# Patient Record
Sex: FEMALE | Race: WHITE | NOT HISPANIC OR LATINO | Employment: FULL TIME | URBAN - METROPOLITAN AREA
[De-identification: names, ages, dates, MRNs, and addresses within clinical notes are randomized per-mention and may not be internally consistent; named-entity substitution may affect disease eponyms.]

---

## 2018-01-31 ENCOUNTER — HOSPITAL ENCOUNTER (OUTPATIENT)
Dept: MAMMOGRAPHY | Facility: CLINIC | Age: 23
Discharge: HOME | End: 2018-01-31
Attending: OBSTETRICS & GYNECOLOGY | Admitting: OBSTETRICS & GYNECOLOGY
Payer: COMMERCIAL

## 2018-01-31 DIAGNOSIS — N63.0 BREAST LUMP: ICD-10-CM

## 2018-01-31 PROCEDURE — 76642 ULTRASOUND BREAST LIMITED: CPT | Mod: 50

## 2018-02-06 ENCOUNTER — TRANSFERRED RECORDS (OUTPATIENT)
Dept: HEALTH INFORMATION MANAGEMENT | Facility: CLINIC | Age: 23
End: 2018-02-06

## 2018-03-12 ENCOUNTER — HOSPITAL ENCOUNTER (OUTPATIENT)
Dept: OCCUPATIONAL THERAPY | Facility: CLINIC | Age: 23
Setting detail: THERAPIES SERIES
End: 2018-03-12
Attending: PHYSICAL MEDICINE & REHABILITATION
Payer: COMMERCIAL

## 2018-03-12 PROCEDURE — 97166 OT EVAL MOD COMPLEX 45 MIN: CPT | Mod: GO | Performed by: REHABILITATION PRACTITIONER

## 2018-03-12 PROCEDURE — 40000249 ZZH STATISTIC VISIT LOW VISION CLINIC: Performed by: REHABILITATION PRACTITIONER

## 2018-03-12 PROCEDURE — 97535 SELF CARE MNGMENT TRAINING: CPT | Mod: GO | Performed by: REHABILITATION PRACTITIONER

## 2018-03-12 ASSESSMENT — ACTIVITIES OF DAILY LIVING (ADL): PRIOR_ADL/IADL_STATUS: INDEPENDENT PRIOR TO ONSET

## 2018-03-13 NOTE — PROGRESS NOTES
03/12/18 1100   Visit Type   Type of Visit Initial       Present No   General Information   Start Of Care Date 03/12/18   Referring Physician Dr. 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  On January 15, 2018 KASI noticed decreased vision in her right eye resulting in a complete right visual field deficit.  Patient is scheduled to follow-up with neuro-ophthalmologist for further visual field testing.   Prior ADL/IADL status Independent prior to onset   Others present at visit Parent(s)   Patient/family Goals Statement Return to driving, improve reading speed, reading tolerance, return to work,    General information comments Pt had neuro ophthalmology assessment and determined field of vision was 73 degrees of visual field in November and has another appointment in April with Dr. Sanchez.  Previous OT provided eye exercises.  Dr. Haq indicated that there is potential for improvement.  Pt also has pseudo strokes as a result of medications.     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  Pt works in  specific area of /.  Pt is coaching for volleyball.   Social/Environment Comment Pt is taking a break with speech therapy for 2 months and is getting assessed for intensive aphasia program.  Pt is attending a GED program.     Pain Assessment   Pain Reported No   Comments Pt has had 2 mimic strokes and was admitted to [facility].     Fall Risk Screen   Fall screen completed by OT   Cognitive/Behavioral   Communication Impaired   Cognitive Status Impaired   Behavior Appropriate   Patient/family aware of diagnosis Yes   How well do you understand your eye condition? Well   Vision related restrictions on visiting with family/friends Moderate   Reported emotional impact of visual impairment Moderate   Adjustment to disability Good   Cognitive/Behavioral Comment Pt has global aphasia impacting her ability to communicate and comprehend.  Pt reports talking slowly helps her to understand verbal information.  Often looks for mom for clarification and assitance in explaining her PMH   Physical Status/Equipment   Physical Status No problems observed/reported   Visual Report   Functional Complaints Reading;Writing;School related tasks;Work related tasks   Visual Complaints Constricted visual fields right eye;Constricted visual fields left eye;Visual fatigue   Complaints comments Right homonymous hemianopsia, interfears with reading and searching extra personal spaces.     Reading glasses (Glasses for distance)   Technology Computer   Equipment comments Cell phone   Lighting and Glare   Is your lighting adequate? Yes/ at home   Contrast Sensitivity   Contrast sensitivity (score/25) 25/25   MN Read   Smallest print size read .4M with errors, 1.0 without errors   Critical print size 2.0M   Words per minute at critical print size 84.5 (150 WNL) and also impacted by aphasia   Functional Reading Screen   Identifies medication bottles Yes   Reads bills Yes   Reads recipes Yes   Reads directions Yes    Reading screen comments Able to read for functional ADL tasks   Education   Learner Patient;Family   Readiness Acceptance   Method Explanation   Response Verbalizes understanding   Education Notes Role of low vision OT   Clinical Impression, OT Eval   Criteria for Skilled Therapeutic Interventions Met yes;treatment indicated   Therapy  Diagnosis: Impaired ADL/IADL with deficits in Reading based ADL;Written communication;School performance;Home management;Work performance   Impairment comments Impairment with all reading based ADL tasks   Assessment of Occupational Performance 3-5 Performance Deficits   Identified Performance Deficits Impairment with all reading based ADL tasks   Clinical Decision Making (Complexity) Moderate complexity   Clinical Impression Comments Pt will benefit from skilled low vision services to improve I and safety with ADL/IADL I   OT Visual Rehabilitation Evaluation Plan   Therapy Plan Occupational therapy intervention   Planned Interventions Visual field loss awareness;Visual skills training for near tasks;Visual skills training for safety in mobility;Visual skills training for location of objects;Visual perceptual training;Low vision compensatory training for reading;Instruction in environmental adaptations for glare;Instruction in environmental adaptations for contrast;Instruction in environmental adaptations for lighting;Optical device/ADL device instruction and training  (Community reintegration)   Frequency / Duration 1x/week x 8 weeks   Risks and Benefits of Treatment have been explained. Yes   Patient, Family in agreement with plan of care Yes   GOALS   Goals Near Vision;Visual Field;Environmental Modification;7;8   Goal 1 - Near Vision   Goal Description: Near Vision Patient will verbalize awareness of visual field Loss and demonstrate improved use of visual skills/adaptive equipment for increased independence in reading-based activities of daily living, written communication and  detail ADL tasks.   Target Date 05/08/18   Goal 2 - Visual field   Goal Description: Visual field Patient will verbalize awareness of visual field loss and demonstrate improved use of visual skills for increased safety/ independence in locating objects/obstacles and in navigation   Target Date 05/08/18   Goal 3 - Environmental modification   Goal Description: Environment modification Patient will demonstrate understanding of the impact of lighting, contrast and glare on ADL activities, in conjunction with environmentally-based ADL modifications   Target Date 05/08/18   Goal 7   Goal Description Pt will complete multiple step problem solving task using 2 organizational strategies and visual accommodations with 90% accuracy.    Target Date 05/08/18   Goal 8   Goal Description Pt will demonstrate ability to read text for 20 minutes for leisure with use of 1-2 environmental adaptations per pt report.    Target Date 05/08/18   Total Evaluation Time   Total Evaluation Time 32   Therapy Certification   Certification date from 03/12/18   Certification date to 05/08/18   Medical Diagnosis Stroke with vision loss, ALL   Certification I certify the need for these services furnished under this plan of treatment and while under my care.  (Physician co-signature of this document indicates review and certification of the therapy plan).

## 2018-03-21 ENCOUNTER — HOSPITAL ENCOUNTER (OUTPATIENT)
Dept: OCCUPATIONAL THERAPY | Facility: CLINIC | Age: 23
Setting detail: THERAPIES SERIES
End: 2018-03-21
Attending: PHYSICAL MEDICINE & REHABILITATION
Payer: COMMERCIAL

## 2018-03-21 PROCEDURE — 40000249 ZZH STATISTIC VISIT LOW VISION CLINIC: Performed by: REHABILITATION PRACTITIONER

## 2018-03-21 PROCEDURE — 97530 THERAPEUTIC ACTIVITIES: CPT | Mod: GO | Performed by: REHABILITATION PRACTITIONER

## 2018-03-21 PROCEDURE — 97535 SELF CARE MNGMENT TRAINING: CPT | Mod: GO | Performed by: REHABILITATION PRACTITIONER

## 2018-04-02 ENCOUNTER — HOSPITAL ENCOUNTER (OUTPATIENT)
Dept: OCCUPATIONAL THERAPY | Facility: CLINIC | Age: 23
Setting detail: THERAPIES SERIES
End: 2018-04-02
Attending: PHYSICAL MEDICINE & REHABILITATION
Payer: COMMERCIAL

## 2018-04-02 PROCEDURE — 97535 SELF CARE MNGMENT TRAINING: CPT | Mod: GO | Performed by: REHABILITATION PRACTITIONER

## 2018-04-02 PROCEDURE — 40000249 ZZH STATISTIC VISIT LOW VISION CLINIC: Performed by: REHABILITATION PRACTITIONER

## 2018-04-02 PROCEDURE — 97530 THERAPEUTIC ACTIVITIES: CPT | Mod: GO | Performed by: REHABILITATION PRACTITIONER

## 2018-04-11 ENCOUNTER — HOSPITAL ENCOUNTER (OUTPATIENT)
Dept: OCCUPATIONAL THERAPY | Facility: CLINIC | Age: 23
Setting detail: THERAPIES SERIES
End: 2018-04-11
Attending: PHYSICAL MEDICINE & REHABILITATION
Payer: COMMERCIAL

## 2018-04-11 PROCEDURE — 97530 THERAPEUTIC ACTIVITIES: CPT | Mod: GO | Performed by: REHABILITATION PRACTITIONER

## 2018-04-11 PROCEDURE — 40000249 ZZH STATISTIC VISIT LOW VISION CLINIC: Performed by: REHABILITATION PRACTITIONER

## 2018-04-18 ENCOUNTER — HOSPITAL ENCOUNTER (OUTPATIENT)
Dept: OCCUPATIONAL THERAPY | Facility: CLINIC | Age: 23
Setting detail: THERAPIES SERIES
End: 2018-04-18
Attending: PHYSICAL MEDICINE & REHABILITATION
Payer: COMMERCIAL

## 2018-04-18 PROCEDURE — 97530 THERAPEUTIC ACTIVITIES: CPT | Mod: GO | Performed by: REHABILITATION PRACTITIONER

## 2018-04-18 PROCEDURE — 40000249 ZZH STATISTIC VISIT LOW VISION CLINIC: Performed by: REHABILITATION PRACTITIONER

## 2018-04-18 PROCEDURE — 97535 SELF CARE MNGMENT TRAINING: CPT | Mod: GO,59 | Performed by: REHABILITATION PRACTITIONER

## 2018-04-25 ENCOUNTER — HOSPITAL ENCOUNTER (OUTPATIENT)
Dept: OCCUPATIONAL THERAPY | Facility: CLINIC | Age: 23
Setting detail: THERAPIES SERIES
End: 2018-04-25
Attending: PHYSICAL MEDICINE & REHABILITATION
Payer: COMMERCIAL

## 2018-04-25 PROCEDURE — 97535 SELF CARE MNGMENT TRAINING: CPT | Mod: GO | Performed by: REHABILITATION PRACTITIONER

## 2018-04-25 PROCEDURE — 40000249 ZZH STATISTIC VISIT LOW VISION CLINIC: Performed by: REHABILITATION PRACTITIONER

## 2018-04-25 PROCEDURE — 97530 THERAPEUTIC ACTIVITIES: CPT | Mod: GO | Performed by: REHABILITATION PRACTITIONER

## 2018-05-02 ENCOUNTER — HOSPITAL ENCOUNTER (OUTPATIENT)
Dept: OCCUPATIONAL THERAPY | Facility: CLINIC | Age: 23
Setting detail: THERAPIES SERIES
End: 2018-05-02
Attending: PHYSICAL MEDICINE & REHABILITATION
Payer: COMMERCIAL

## 2018-05-02 PROCEDURE — 97535 SELF CARE MNGMENT TRAINING: CPT | Mod: GO | Performed by: REHABILITATION PRACTITIONER

## 2018-05-02 PROCEDURE — 40000249 ZZH STATISTIC VISIT LOW VISION CLINIC: Performed by: REHABILITATION PRACTITIONER

## 2018-05-02 NOTE — PROGRESS NOTES
05/02/18 1100   Signing Clinician's Name / Credentials   Signing clinician's name / credentials Viktoriay [place] OTR/L   Session Number   Session Number 7/90    Goal 1 - Near Vision   Goal Description: Near Vision Patient will verbalize awareness of visual field Loss and demonstrate improved use of visual skills/adaptive equipment for increased independence in reading-based activities of daily living, written communication and detail ADL tasks.   Target Date 05/08/18   Date Met 05/02/18   Goal 2 - Visual field   Goal Description: Visual field Patient will verbalize awareness of visual field loss and demonstrate improved use of visual skills for increased safety/ independence in locating objects/obstacles and in navigation   Target Date 05/08/18   Date Met 04/18/18   Goal 3 - Environmental modification   Goal Description: Environment modification Patient will demonstrate understanding of the impact of lighting, contrast and glare on ADL activities, in conjunction with environmentally-based ADL modifications   Target Date 05/08/18   Date Met 05/02/18   Goal 7   Goal Description Pt will complete multiple step problem solving task using 2 organizational strategies and visual accommodations with 90% accuracy.    Target Date 05/08/18   Date Met (Defer to SLP)   Goal 8   Goal Description Pt will demonstrate ability to read text for 20 minutes for leisure with use of 1-2 environmental adaptations per pt report.    Target Date 05/08/18   Date Met 05/02/18   Therapy Diagnosis   Therapy  Diagnosis: Impaired ADL/IADL with deficits in Reading based ADL;Written communication;School performance;Home management;Work performance   Subjective Report   Subjective Report Larissa reports she is able to read for 30 minutes.  She reports she has attempted the read right program and it is difficult to pay attention to as it isn't something she is interested in.     Self Care/Home Management   Skilled Intervention Training in use of  electronic aids for reading   Skilled Intervention Comment Environmental adaptations for reading   Treatment Detail Completed Pepper visual skills for reading test and see objective measures for details.  Pt demonstrates improved speed and accuracy.  Pt was further educated in use of environmental tools to improve reading tolerance including lighting, visual anchor and option of using books on tape.  Completed application for books on tape through [employer].  Larissa was able to fill out the form with 100% accuracy.   Larissa was educated in non-visual options for reading based ADL tasks including seeing AI Optical character recognition software (application) to confirm information read, improve reading endurance and comprehension of information read.  Recommend she read the content (mail, page of book, form to fill out) use seeing AI to take a picture and read along with the text on the phone as confirmation of what was read.  Also recommend Larissa continue to use read right program as a reading endurane and speed tool.     Dynavision Mode A (single stimulus attention, 1 minute)   Patient Score (Mode A, 1 min) 83   Dynavision Mode A (SSA, 1 Min) Comment 52+  considered safe range   Dynavision Mode A Continuous (single stimulus attention, 4 minutes)   Patient Score (Mode A, 4 min) 317   Dynavision Mode A Continous Comment 200+ considered safe range   Dynavision Mode B (timed attention, 1 minute)   Flash rate 1 second    Patient score (Mode B, 1 min) 86 (42+ considered WNL)   Dynavision Mode B Comment Improved reaction time   Dynavision Mode B Timed Divided Attention (1-second flash rate, 1 minute)   Patient score, targets struck 71; 45 with 25% green and 8/10#)   Patient score, central stimulus identified (out of 10): calculate % 10/10   Dynavision Mode B Timed Divided Attention Comment 35+ considered safe range   Therapeutic Activity: Navigation   Skilled Intervention Compensatory strategies;Visual search skills    Scan course score trial 1 (20 targests, 10 left & 10 right) 19/20 in 20 seconds   Scan course score trial 2, following intervention 20/20 in 15 seconds   MN Read   Smallest print size read .4M with errors, 1.0 without errors   Critical print size 2.0M   Words per minute at critical print size 84.5 (150 WNL) and also impacted by aphasia   Visual Skills for Reading   Test print size 3M   Reading accuracy 98.3% (was 95%)   Reading speed 65 WPM ( was 53)   VSRT Comments Pt demonstrates improvement in reading speed and accuracy.    Equipment/Resources   Written resources provided Registered for [facility] books on tape/radio talking book program;Low vision devices   Equipment/Resources comment Seeing , state services books on tape   Assessments Completed   Assessments Completed Kamla visual skills for reading test   Education   Learner Patient;Family   Readiness Acceptance   Method Literature;Explanation   Response Demonstrates understanding   Education Notes Improvement in reading speed, Reading AI, read right program   Plan   Plan for next session Discharge from low vision OT, continue with SLP services.     Comments   Comments Pt was seen for 7 sessions of visual rehabilitation OT services.  Larissa is scoring in above average range for reaction time, scanning in dynamic environments and divided attention.  She is compensating well for her visual field deficit and demonstrates safe mobility in a dynamic environment.  Larissa also demonstrates improvement in reading speed and accuracy as measures through Kamla visual skills for reading.  Will defer any language and memory retraining to SLP services at this time.  All goals have been addressed and met and will discharge from OT.     Total Session Time   Timed Code Treatment Minutes 54   Total Treatment Time (sum of timed and untimed services) 54

## 2023-04-11 ENCOUNTER — ANCILLARY ORDERS (OUTPATIENT)
Dept: MAMMOGRAPHY | Facility: CLINIC | Age: 28
End: 2023-04-11

## 2023-04-11 DIAGNOSIS — R92.2 INCONCLUSIVE MAMMOGRAPHY: ICD-10-CM

## 2023-04-12 ENCOUNTER — ANCILLARY ORDERS (OUTPATIENT)
Dept: MAMMOGRAPHY | Facility: CLINIC | Age: 28
End: 2023-04-12

## 2023-04-12 DIAGNOSIS — R92.2 INCONCLUSIVE MAMMOGRAPHY: ICD-10-CM

## 2023-04-26 ENCOUNTER — ANCILLARY ORDERS (OUTPATIENT)
Dept: MAMMOGRAPHY | Facility: CLINIC | Age: 28
End: 2023-04-26

## 2023-04-26 ENCOUNTER — HOSPITAL ENCOUNTER (OUTPATIENT)
Dept: MAMMOGRAPHY | Facility: CLINIC | Age: 28
Discharge: HOME | End: 2023-04-26
Attending: PHYSICIAN ASSISTANT
Payer: COMMERCIAL

## 2023-04-26 DIAGNOSIS — N63.21 MASS OF UPPER OUTER QUADRANT OF LEFT BREAST: ICD-10-CM

## 2023-04-26 PROCEDURE — 76642 ULTRASOUND BREAST LIMITED: CPT | Mod: LT

## 2024-05-20 ENCOUNTER — LAB REQUISITION (OUTPATIENT)
Dept: LAB | Facility: CLINIC | Age: 29
End: 2024-05-20

## 2024-05-20 DIAGNOSIS — R30.0 DYSURIA: ICD-10-CM

## 2024-05-20 PROCEDURE — 87086 URINE CULTURE/COLONY COUNT: CPT | Performed by: PHYSICIAN ASSISTANT

## 2024-05-22 LAB — BACTERIA UR CULT: NORMAL

## 2024-06-30 ENCOUNTER — HEALTH MAINTENANCE LETTER (OUTPATIENT)
Age: 29
End: 2024-06-30

## 2025-07-13 ENCOUNTER — HEALTH MAINTENANCE LETTER (OUTPATIENT)
Age: 30
End: 2025-07-13